# Patient Record
Sex: MALE | Race: WHITE | Employment: FULL TIME | URBAN - METROPOLITAN AREA
[De-identification: names, ages, dates, MRNs, and addresses within clinical notes are randomized per-mention and may not be internally consistent; named-entity substitution may affect disease eponyms.]

---

## 2021-01-25 ENCOUNTER — OFFICE VISIT (OUTPATIENT)
Dept: SURGERY | Age: 26
End: 2021-01-25
Payer: COMMERCIAL

## 2021-01-25 VITALS
SYSTOLIC BLOOD PRESSURE: 147 MMHG | TEMPERATURE: 97.5 F | WEIGHT: 315 LBS | DIASTOLIC BLOOD PRESSURE: 99 MMHG | OXYGEN SATURATION: 100 % | HEIGHT: 72 IN | HEART RATE: 69 BPM | BODY MASS INDEX: 42.66 KG/M2

## 2021-01-25 DIAGNOSIS — R22.42 MASS OF LEFT THIGH: Primary | ICD-10-CM

## 2021-01-25 PROCEDURE — 99243 OFF/OP CNSLTJ NEW/EST LOW 30: CPT | Performed by: SURGERY

## 2021-01-25 NOTE — PROGRESS NOTES
To: TUTU Campos    From: Porfirio Zheng MD    Thank you for sending Gabe Yeboah to see us. Please note that this dictation was completed with [email or web address], the computer voice recognition software. Quite often unanticipated grammatical, syntax, homophones, and other interpretive errors are inadvertently transcribed by the software. Please disregard these errors. Please excuse any errors that have escaped final proofreading. Encounter Date: 1/25/2021  History and Physical    Assessment:   Super obesity (BMI>50) with left anteromedial thigh adipose akin to pannus bilaterally, left greater than right. Due to the overall obesity, a discrete mass cannot be palpated. He has had problems with rash under the left thigh mass in a fold. Body mass index is 51.94 kg/m². Denies diabetes, HTN, OA. Plan:   I explained that excision of this subcutaneous adipose would likely be considered cosmetic. There is a possibility that there is an underlying lipoma but given the bilateral component this is less likely. An MRI could be done to look for a specific discrete mass. Ultrasound would be unhelpful given the size of the area. I explained, that most urgently given his BMI of 52, he needed to lose weight. He is 27years old and without significant weight loss is likely to go on to develop diabetes, hypertension, and osteoarthritis. Understandably, at his age he is concerned about the appearance of his thighs but we discussed that this is not an urgent matter. We will try to assist him in getting set up with a primary care doctor, as his overall health needs to be addressed. It is possible at that point, had an MRI might be able to be approved. If there is a discrete mass, excision would be indicated. If there is not a discrete mass this would remain a cosmetic issue and referral to a plastic surgeon would be done.     HPI:   Gabe Yeboah is a 27 y.o. male who is seen in consultation at the request of Parveen Barnes from [facility] for possible lipoma of the anteromedial left thigh. He has noticed this for over 10 years and thinks he has 1 on the right side as well. He also notes that there is a fold under the 1 on the left where he had an itchy rash for a time. The rash has resolved. This is not painful. There has not been any apparent infection of the skin and certainly has not been any drainage. Past Medical History:   Diagnosis Date    Asthma      Past Surgical History:   Procedure Laterality Date    HX HEENT      tonsilectomy      Family History   Problem Relation Age of Onset    Hypertension Father     Diabetes Maternal Grandmother     Cancer Maternal Grandfather         lung     Social History     Tobacco Use    Smoking status: Former Smoker     Quit date:      Years since quittin.0    Smokeless tobacco: Never Used   Substance Use Topics    Alcohol use: Yes     Comment: rare      No current outpatient medications on file. No current facility-administered medications for this visit. Allergies:  No Known Allergies    Review of Systems:  10 systems reviewed. See scanned sheet in \"Media\" section. See HPI for pertinent positives and negatives. Objective:     Visit Vitals  BP (!) 147/99   Pulse 69   Temp 97.5 °F (36.4 °C)   Ht 6' (1.829 m)   Wt (!) 173.7 kg (383 lb)   SpO2 100%   BMI 51.94 kg/m²       Physical Exam:  General appearance  Alert, cooperative, no distress, appears stated age, obese   HEENT Anicteric           Lungs   Clear to auscultation bilaterally   Heart  Regular rate and rhythm. Abdomen   Soft, non-tender, large pannus. Extremities Abundant adipose tissue bilateral anteromedial thighs, L>R. No rash or cellulitis.      Pulses 2+ right radial           Neurologic Without overt sensory or motor deficit       Signed By: Courtney Zendejas MD     2021

## 2021-01-25 NOTE — Clinical Note
1/25/2021 Patient: Gabe Yeboah YOB: 1995 Date of Visit: 1/25/2021 Anisha Maria NP 
[address] [facility] [place] [address] Via Fax: [phone] Dear Anisha Maria NP, Thank you for referring Mr. Adán Cavazos to  [facility] for evaluation. My notes for this consultation are attached. If you have questions, please do not hesitate to call me. I look forward to following your patient along with you.  
 
 
Sincerely, 
 
Porfirio Zheng MD

## 2021-01-25 NOTE — PROGRESS NOTES
Chief Complaint   Patient presents with    Skin Problem     seen at the request of Dr. Quan arias large lipoma on left leg

## 2021-01-26 ENCOUNTER — TELEPHONE (OUTPATIENT)
Dept: SURGERY | Age: 26
End: 2021-01-26

## 2021-01-26 NOTE — TELEPHONE ENCOUNTER
Informed patient per provider need PCP for obesity, supplied patient with Dr. Desirae Galindo number [phone]. Express understanding.